# Patient Record
Sex: MALE | Race: OTHER | NOT HISPANIC OR LATINO
[De-identification: names, ages, dates, MRNs, and addresses within clinical notes are randomized per-mention and may not be internally consistent; named-entity substitution may affect disease eponyms.]

---

## 2017-07-24 ENCOUNTER — APPOINTMENT (OUTPATIENT)
Dept: VASCULAR SURGERY | Facility: CLINIC | Age: 67
End: 2017-07-24

## 2017-07-24 VITALS
RESPIRATION RATE: 16 BRPM | HEART RATE: 62 BPM | BODY MASS INDEX: 23.19 KG/M2 | DIASTOLIC BLOOD PRESSURE: 85 MMHG | SYSTOLIC BLOOD PRESSURE: 158 MMHG | HEIGHT: 68 IN | TEMPERATURE: 98.6 F | WEIGHT: 153 LBS

## 2017-07-24 RX ORDER — AMLODIPINE BESYLATE AND BENAZEPRIL HYDROCHLORIDE 5; 10 MG/1; MG/1
5-10 CAPSULE ORAL
Refills: 0 | Status: ACTIVE | COMMUNITY

## 2018-07-23 ENCOUNTER — APPOINTMENT (OUTPATIENT)
Dept: VASCULAR SURGERY | Facility: CLINIC | Age: 68
End: 2018-07-23
Payer: MEDICARE

## 2018-07-23 VITALS
BODY MASS INDEX: 24.44 KG/M2 | SYSTOLIC BLOOD PRESSURE: 144 MMHG | HEIGHT: 69 IN | DIASTOLIC BLOOD PRESSURE: 75 MMHG | HEART RATE: 61 BPM | WEIGHT: 165 LBS

## 2018-07-23 DIAGNOSIS — Z09 ENCOUNTER FOR FOLLOW-UP EXAMINATION AFTER COMPLETED TREATMENT FOR CONDITIONS OTHER THAN MALIGNANT NEOPLASM: ICD-10-CM

## 2018-07-23 PROCEDURE — 93880 EXTRACRANIAL BILAT STUDY: CPT

## 2018-07-23 PROCEDURE — 99214 OFFICE O/P EST MOD 30 MIN: CPT

## 2019-07-22 ENCOUNTER — APPOINTMENT (OUTPATIENT)
Dept: VASCULAR SURGERY | Facility: CLINIC | Age: 69
End: 2019-07-22
Payer: MEDICARE

## 2019-07-22 PROCEDURE — 93880 EXTRACRANIAL BILAT STUDY: CPT

## 2019-09-02 PROBLEM — Z09 FOLLOW UP: Status: ACTIVE | Noted: 2018-07-23

## 2020-07-20 ENCOUNTER — APPOINTMENT (OUTPATIENT)
Dept: VASCULAR SURGERY | Facility: CLINIC | Age: 70
End: 2020-07-20
Payer: MEDICARE

## 2020-07-20 VITALS
HEIGHT: 68 IN | SYSTOLIC BLOOD PRESSURE: 152 MMHG | DIASTOLIC BLOOD PRESSURE: 79 MMHG | HEART RATE: 63 BPM | BODY MASS INDEX: 22.73 KG/M2 | WEIGHT: 150 LBS

## 2020-07-20 VITALS — TEMPERATURE: 96.8 F

## 2020-07-20 PROCEDURE — 93880 EXTRACRANIAL BILAT STUDY: CPT

## 2020-07-20 PROCEDURE — 99213 OFFICE O/P EST LOW 20 MIN: CPT

## 2020-07-20 NOTE — ASSESSMENT
[FreeTextEntry1] : 68 yo male with a history of bilateral carotid stenosis s/p left cea presents for follow up.  pt without any complaints at this time.\par \par duplex shows patent left cea with stable 16-49% stenosis and right 16-49% stenosis unchanged \par pt to continue with asa \par encouraged pt to exercise \par pt to follow up in 1 year with repeat duplex

## 2020-07-20 NOTE — PHYSICAL EXAM
[2+] : left 2+ [No Rash or Lesion] : No rash or lesion [Alert] : alert [Calm] : calm [JVD] : no jugular venous distention  [Normal Breath Sounds] : Normal breath sounds [Normal Heart Sounds] : normal heart sounds [Ankle Swelling (On Exam)] : not present [] : not present [Abdomen Masses] : No abdominal masses [Varicose Veins Of Lower Extremities] : not present [Skin Ulcer] : no ulcer [de-identified] : no facial asymmetry noted [de-identified] : appears well

## 2020-07-20 NOTE — HISTORY OF PRESENT ILLNESS
[FreeTextEntry1] : 70 yo male with a history of bilateral carotid stenosis s/p left cea presents for follow up.  pt without any complaints at this time.  pt denies any stroke like symptoms denies any stroke like symptoms.  \par pt reports a history of pad but denies any history of rest pain, ulcers or claudications\par pt does not walk much but no issues with pain that stops him from walking.  \par \par

## 2021-07-19 ENCOUNTER — APPOINTMENT (OUTPATIENT)
Dept: VASCULAR SURGERY | Facility: CLINIC | Age: 71
End: 2021-07-19
Payer: MEDICARE

## 2021-07-19 VITALS
DIASTOLIC BLOOD PRESSURE: 78 MMHG | HEART RATE: 69 BPM | WEIGHT: 152 LBS | SYSTOLIC BLOOD PRESSURE: 148 MMHG | BODY MASS INDEX: 23.04 KG/M2 | HEIGHT: 68 IN

## 2021-07-19 PROCEDURE — 99213 OFFICE O/P EST LOW 20 MIN: CPT

## 2021-07-19 PROCEDURE — 93880 EXTRACRANIAL BILAT STUDY: CPT

## 2021-07-19 RX ORDER — IRBESARTAN 150 MG/1
150 TABLET ORAL
Qty: 90 | Refills: 0 | Status: ACTIVE | COMMUNITY
Start: 2021-02-10

## 2021-07-19 RX ORDER — METOPROLOL TARTRATE 50 MG/1
50 TABLET, FILM COATED ORAL
Qty: 180 | Refills: 0 | Status: ACTIVE | COMMUNITY
Start: 2021-02-10

## 2021-07-19 RX ORDER — DICLOFENAC SODIUM 1% 10 MG/G
1 GEL TOPICAL
Qty: 100 | Refills: 0 | Status: ACTIVE | COMMUNITY
Start: 2020-09-10

## 2021-07-19 RX ORDER — AMLODIPINE BESYLATE 5 MG/1
5 TABLET ORAL
Qty: 90 | Refills: 0 | Status: ACTIVE | COMMUNITY
Start: 2021-05-19

## 2021-07-19 NOTE — PHYSICAL EXAM
[JVD] : no jugular venous distention  [Normal Breath Sounds] : Normal breath sounds [Normal Heart Sounds] : normal heart sounds [2+] : left 2+ [Ankle Swelling (On Exam)] : not present [Varicose Veins Of Lower Extremities] : not present [] : not present [Abdomen Masses] : No abdominal masses [No Rash or Lesion] : No rash or lesion [Skin Ulcer] : no ulcer [Alert] : alert [Calm] : calm [de-identified] : appears well  [de-identified] : no facial asymmetry noted

## 2021-07-19 NOTE — HISTORY OF PRESENT ILLNESS
[FreeTextEntry1] : 68 yo male with a history of bilateral carotid stenosis s/p left cea presents for follow up.  pt without any complaints at this time.  pt denies any stroke like symptoms denies any stroke like symptoms.  \par pt reports a history of pad but denies any history of rest pain, ulcers or claudications\par pt does not walk much but no issues with pain that stops him from walking.  \par \par

## 2022-08-01 ENCOUNTER — APPOINTMENT (OUTPATIENT)
Dept: VASCULAR SURGERY | Facility: CLINIC | Age: 72
End: 2022-08-01

## 2022-08-01 PROCEDURE — 93880 EXTRACRANIAL BILAT STUDY: CPT

## 2022-08-01 PROCEDURE — 99214 OFFICE O/P EST MOD 30 MIN: CPT

## 2022-08-01 NOTE — PHYSICAL EXAM
[JVD] : no jugular venous distention  [Normal Breath Sounds] : Normal breath sounds [Normal Heart Sounds] : normal heart sounds [2+] : left 2+ [Ankle Swelling (On Exam)] : not present [Varicose Veins Of Lower Extremities] : not present [Abdomen Masses] : No abdominal masses [] : not present [No Rash or Lesion] : No rash or lesion [Skin Ulcer] : no ulcer [Alert] : alert [Calm] : calm [de-identified] : appears well  [de-identified] : no facial asymmetry noted

## 2022-12-14 NOTE — ASSESSMENT
Stable. Continue to monitor. [FreeTextEntry1] : 70 yo male with a history of bilateral carotid stenosis s/p left cea presents for follow up.  pt without any complaints at this time.\par \par duplex shows patent left cea with stable 16-49% stenosis and right 16-49% stenosis unchanged \par pt to continue with asa \par encouraged pt to exercise \par pt to follow up in 1 year with repeat duplex

## 2023-04-03 ENCOUNTER — TRANSCRIPTION ENCOUNTER (OUTPATIENT)
Age: 73
End: 2023-04-03

## 2023-04-03 ENCOUNTER — NON-APPOINTMENT (OUTPATIENT)
Age: 73
End: 2023-04-03

## 2023-04-03 ENCOUNTER — APPOINTMENT (OUTPATIENT)
Dept: THORACIC SURGERY | Facility: CLINIC | Age: 73
End: 2023-04-03
Payer: MEDICARE

## 2023-04-03 VITALS
SYSTOLIC BLOOD PRESSURE: 130 MMHG | HEART RATE: 57 BPM | DIASTOLIC BLOOD PRESSURE: 67 MMHG | BODY MASS INDEX: 20 KG/M2 | WEIGHT: 132 LBS | OXYGEN SATURATION: 100 % | HEIGHT: 68 IN | RESPIRATION RATE: 15 BRPM

## 2023-04-03 DIAGNOSIS — R59.0 LOCALIZED ENLARGED LYMPH NODES: ICD-10-CM

## 2023-04-03 PROCEDURE — 99205 OFFICE O/P NEW HI 60 MIN: CPT

## 2023-04-05 RX ORDER — SULFAMETHOXAZOLE AND TRIMETHOPRIM 800; 160 MG/1; MG/1
800-160 TABLET ORAL
Qty: 6 | Refills: 0 | Status: COMPLETED | COMMUNITY
Start: 2022-02-09 | End: 2023-04-05

## 2023-04-05 NOTE — DATA REVIEWED
[FreeTextEntry1] : I have independently reviewed the following:\par CT Chest on 2/14/23:\par PET/CT on 3/22/23

## 2023-04-05 NOTE — ASSESSMENT
[FreeTextEntry1] : Mr. MCKAYLA BOWERS, 72 year old male, former smoker, w/ hx of HTN, who presented with abnormal CT scan of the chest. \par \par CT Chest on 2/14/23:\par - patchy area of ggo consolidation in RLL 5.2 cm\par - ggo in Rt apex 2.6 cm\par - ggo in RML 1.4 cm\par - ggo in LLL 1.6 cm \par - subpleural LLL 7 mm \par - subcarinal LN 1.6 cm, precarinal LN 1.4 cm\par - trace pericardial effusion\par \par PET/CT on 3/22/23:\par - mildly FDG avid Rt lower paratracheal LN 1.5 cm, SUV=2.7 \par - mildly enlarged subcarinal LN, 2.9 cm, SUV=2.5\par - small hiatal hernia with mild increased FDG uptake in distal esophagus/GE junction, SUV=4\par - hypermetabolic focu in mid sigmoid colon, SUV=4.7\par \par I have reviewed the patient's medical records and diagnostic images at time of this office consultation and have made the following recommendation:\par 1. PET reviewed, I recommended navigational bronchoscopy, EBUS biopsy, possible mediastinoscopy. Risks and benefits and alternatives explained to patient, all questions answered, patient agreed to proceed with surgery.\par 2. New CT Chest w/o contrast for navigational bronch protocol\par 3. Cardiac clearance \par \par \par I, , BERNADINE GA, personally performed the evaluation and management (E/M) services for this new patient.  That E/M includes conducting the initial examination, assessing all conditions, and establishing the plan of care.  Today, my ACP, Vidya Culver NP was here to observe my evaluation and management services for this patient to be followed going forward.\par \par

## 2023-04-05 NOTE — PHYSICAL EXAM
[Restricted in physically strenuous activity but ambulatory and able to carry out work of a light or sedentary nature] : Status 1- Restricted in physically strenuous activity but ambulatory and able to carry out work of a light or sedentary nature, e.g., light house work, office work [General Appearance - Alert] : alert [General Appearance - In No Acute Distress] : in no acute distress [Sclera] : the sclera and conjunctiva were normal [PERRL With Normal Accommodation] : pupils were equal in size, round, and reactive to light [Extraocular Movements] : extraocular movements were intact [Outer Ear] : the ears and nose were normal in appearance [Oropharynx] : the oropharynx was normal [Neck Appearance] : the appearance of the neck was normal [Neck Cervical Mass (___cm)] : no neck mass was observed [Jugular Venous Distention Increased] : there was no jugular-venous distention [Thyroid Diffuse Enlargement] : the thyroid was not enlarged [Thyroid Nodule] : there were no palpable thyroid nodules [Auscultation Breath Sounds / Voice Sounds] : lungs were clear to auscultation bilaterally [Heart Rate And Rhythm] : heart rate was normal and rhythm regular [Heart Sounds] : normal S1 and S2 [Heart Sounds Gallop] : no gallops [Murmurs] : no murmurs [Heart Sounds Pericardial Friction Rub] : no pericardial rub [Examination Of The Chest] : the chest was normal in appearance [Chest Visual Inspection Thoracic Asymmetry] : no chest asymmetry [Diminished Respiratory Excursion] : normal chest expansion [2+] : left 2+ [Breast Appearance] : normal in appearance [Breast Palpation Mass] : no palpable masses [Bowel Sounds] : normal bowel sounds [Abdomen Soft] : soft [Abdomen Tenderness] : non-tender [Abdomen Mass (___ Cm)] : no abdominal mass palpated [FreeTextEntry1] : deferred [Cervical Lymph Nodes Enlarged Posterior Bilaterally] : posterior cervical [Cervical Lymph Nodes Enlarged Anterior Bilaterally] : anterior cervical [Supraclavicular Lymph Nodes Enlarged Bilaterally] : supraclavicular [No CVA Tenderness] : no ~M costovertebral angle tenderness [No Spinal Tenderness] : no spinal tenderness [Abnormal Walk] : normal gait [Nail Clubbing] : no clubbing  or cyanosis of the fingernails [Musculoskeletal - Swelling] : no joint swelling seen [Motor Tone] : muscle strength and tone were normal [Skin Color & Pigmentation] : normal skin color and pigmentation [Skin Turgor] : normal skin turgor [] : no rash [Deep Tendon Reflexes (DTR)] : deep tendon reflexes were 2+ and symmetric [Sensation] : the sensory exam was normal to light touch and pinprick [No Focal Deficits] : no focal deficits [Oriented To Time, Place, And Person] : oriented to person, place, and time [Impaired Insight] : insight and judgment were intact [Affect] : the affect was normal

## 2023-04-05 NOTE — CONSULT LETTER
[Dear  ___] : Dear  [unfilled], [Consult Letter:] : I had the pleasure of evaluating your patient, [unfilled]. [( Thank you for referring [unfilled] for consultation for _____ )] : Thank you for referring [unfilled] for consultation for [unfilled] [Please see my note below.] : Please see my note below. [Consult Closing:] : Thank you very much for allowing me to participate in the care of this patient.  If you have any questions, please do not hesitate to contact me. [Sincerely,] : Sincerely, [FreeTextEntry2] : Hugh Briceño DO (Hem/Onc/ref) [FreeTextEntry3] : Daniela Gonsales MD\par Attending Surgeon\par Division of Thoracic Surgery\par , [facility] [employer] at [facility]\par

## 2023-04-05 NOTE — HISTORY OF PRESENT ILLNESS
[FreeTextEntry1] : Mr. MCKAYLA BOWERS, 72 year old male, former smoker, w/ hx of HTN, who presented with abnormal CT scan of the chest. \par \par CT Chest on 2/14/23:\par - patchy area of ggo consolidation in RLL 5.2 cm\par - ggo in Rt apex 2.6 cm\par - ggo in RML 1.4 cm\par - ggo in LLL 1.6 cm \par - subpleural LLL 7 mm \par - subcarinal LN 1.6 cm, precarinal LN 1.4 cm\par - trace pericardial effusion\par \par PET/CT on 3/22/23:\par - mildly FDG avid Rt lower paratracheal LN 1.5 cm, SUV=2.7 \par - mildly enlarged subcarinal LN, 2.9 cm, SUV=2.5\par - small hiatal hernia with mild increased FDG uptake in distal esophagus/GE junction, SUV=4\par - hypermetabolic focu in mid sigmoid colon, SUV=4.7\par \par Pt presents today for CT Sx consultation, referred by Dr. Briceño. \par \par \par

## 2023-04-11 ENCOUNTER — OUTPATIENT (OUTPATIENT)
Dept: OUTPATIENT SERVICES | Facility: HOSPITAL | Age: 73
LOS: 1 days | End: 2023-04-11

## 2023-04-11 VITALS
SYSTOLIC BLOOD PRESSURE: 140 MMHG | HEART RATE: 60 BPM | OXYGEN SATURATION: 97 % | DIASTOLIC BLOOD PRESSURE: 70 MMHG | RESPIRATION RATE: 16 BRPM | HEIGHT: 67.5 IN | TEMPERATURE: 97 F | WEIGHT: 130.07 LBS

## 2023-04-11 DIAGNOSIS — R91.8 OTHER NONSPECIFIC ABNORMAL FINDING OF LUNG FIELD: ICD-10-CM

## 2023-04-11 DIAGNOSIS — Z98.89 OTHER SPECIFIED POSTPROCEDURAL STATES: Chronic | ICD-10-CM

## 2023-04-11 DIAGNOSIS — I10 ESSENTIAL (PRIMARY) HYPERTENSION: ICD-10-CM

## 2023-04-11 DIAGNOSIS — Z91.89 OTHER SPECIFIED PERSONAL RISK FACTORS, NOT ELSEWHERE CLASSIFIED: ICD-10-CM

## 2023-04-11 DIAGNOSIS — Z98.890 OTHER SPECIFIED POSTPROCEDURAL STATES: Chronic | ICD-10-CM

## 2023-04-11 DIAGNOSIS — R59.0 LOCALIZED ENLARGED LYMPH NODES: ICD-10-CM

## 2023-04-11 LAB
ALBUMIN SERPL ELPH-MCNC: 4.2 G/DL — SIGNIFICANT CHANGE UP (ref 3.3–5)
ALP SERPL-CCNC: 90 U/L — SIGNIFICANT CHANGE UP (ref 40–120)
ALT FLD-CCNC: 15 U/L — SIGNIFICANT CHANGE UP (ref 4–41)
ANION GAP SERPL CALC-SCNC: 12 MMOL/L — SIGNIFICANT CHANGE UP (ref 7–14)
AST SERPL-CCNC: 19 U/L — SIGNIFICANT CHANGE UP (ref 4–40)
BILIRUB SERPL-MCNC: 0.4 MG/DL — SIGNIFICANT CHANGE UP (ref 0.2–1.2)
BLD GP AB SCN SERPL QL: NEGATIVE — SIGNIFICANT CHANGE UP
BUN SERPL-MCNC: 20 MG/DL — SIGNIFICANT CHANGE UP (ref 7–23)
CALCIUM SERPL-MCNC: 10.3 MG/DL — SIGNIFICANT CHANGE UP (ref 8.4–10.5)
CHLORIDE SERPL-SCNC: 101 MMOL/L — SIGNIFICANT CHANGE UP (ref 98–107)
CO2 SERPL-SCNC: 24 MMOL/L — SIGNIFICANT CHANGE UP (ref 22–31)
CREAT SERPL-MCNC: 1.45 MG/DL — HIGH (ref 0.5–1.3)
EGFR: 51 ML/MIN/1.73M2 — LOW
GLUCOSE SERPL-MCNC: 97 MG/DL — SIGNIFICANT CHANGE UP (ref 70–99)
HCT VFR BLD CALC: 42.9 % — SIGNIFICANT CHANGE UP (ref 39–50)
HGB BLD-MCNC: 13.5 G/DL — SIGNIFICANT CHANGE UP (ref 13–17)
MCHC RBC-ENTMCNC: 29.2 PG — SIGNIFICANT CHANGE UP (ref 27–34)
MCHC RBC-ENTMCNC: 31.5 GM/DL — LOW (ref 32–36)
MCV RBC AUTO: 92.7 FL — SIGNIFICANT CHANGE UP (ref 80–100)
NRBC # BLD: 0 /100 WBCS — SIGNIFICANT CHANGE UP (ref 0–0)
NRBC # FLD: 0 K/UL — SIGNIFICANT CHANGE UP (ref 0–0)
PLATELET # BLD AUTO: 385 K/UL — SIGNIFICANT CHANGE UP (ref 150–400)
POTASSIUM SERPL-MCNC: 5 MMOL/L — SIGNIFICANT CHANGE UP (ref 3.5–5.3)
POTASSIUM SERPL-SCNC: 5 MMOL/L — SIGNIFICANT CHANGE UP (ref 3.5–5.3)
PROT SERPL-MCNC: 7.4 G/DL — SIGNIFICANT CHANGE UP (ref 6–8.3)
RBC # BLD: 4.63 M/UL — SIGNIFICANT CHANGE UP (ref 4.2–5.8)
RBC # FLD: 14.4 % — SIGNIFICANT CHANGE UP (ref 10.3–14.5)
RH IG SCN BLD-IMP: POSITIVE — SIGNIFICANT CHANGE UP
SODIUM SERPL-SCNC: 137 MMOL/L — SIGNIFICANT CHANGE UP (ref 135–145)
WBC # BLD: 11.39 K/UL — HIGH (ref 3.8–10.5)
WBC # FLD AUTO: 11.39 K/UL — HIGH (ref 3.8–10.5)

## 2023-04-11 RX ORDER — SODIUM CHLORIDE 9 MG/ML
1000 INJECTION, SOLUTION INTRAVENOUS
Refills: 0 | Status: DISCONTINUED | OUTPATIENT
Start: 2023-04-17 | End: 2023-05-01

## 2023-04-11 NOTE — H&P PST ADULT - RADIAL PULSE
Patient: Dougie Poritllo    Procedure(s):  Left Total Hip Arthroplasty  - Wound Class: I-Clean    Diagnosis:DJD  Diagnosis Additional Information: djd left hip    Anesthesia Type:  General    Note:  Anesthesia Post Evaluation    Patient location during evaluation: PACU  Patient participation: Able to fully participate in evaluation  Level of consciousness: awake and alert  Pain management: adequate  Airway patency: patent  Cardiovascular status: acceptable  Respiratory status: acceptable  Hydration status: acceptable  PONV: controlled     Anesthetic complications: None          Last vitals:  Vitals:    07/06/18 1130 07/06/18 1207 07/06/18 1245   BP: 126/74 122/62 137/66   Pulse:      Resp: 10 15 13   Temp: 97  F (36.1  C) 95.3  F (35.2  C)    SpO2: 99% 100% 100%         Electronically Signed By: Johnnie Rios MD  July 6, 2018  1:11 PM  
right normal/left normal

## 2023-04-11 NOTE — H&P PST ADULT - NSICDXPASTMEDICALHX_GEN_ALL_CORE_FT
PAST MEDICAL HISTORY:  Abscess s/p left neck abscess as in infant    H/O carotid stenosis     Hyperlipidemia     Hypertension     Localized enlarged lymph nodes     PAD (peripheral artery disease)     Trauma s/p fall from tree onto left knee with meniscus tear

## 2023-04-11 NOTE — H&P PST ADULT - NSICDXPASTSURGICALHX_GEN_ALL_CORE_FT
PAST SURGICAL HISTORY:  H/O arthroscopy of left knee     History of CEA (carotid endarterectomy)     Status post appendectomy open appendectomy at age 10 years

## 2023-04-11 NOTE — H&P PST ADULT - PROBLEM SELECTOR PLAN 1
Patient tentatively scheduled for navigational bronchoscopy endobronchial ultrasound possible mediastinoscopy with cytology for 4/17/23. Pre-op instructions provided. Pt given verbal and written instructions with teach back on chlorhexidine shampoo and pepcid. Pt verbalized understanding with return demonstration.     CBC CMP T&S done.  h/o hyperkalemia 5.8 on 3/28. Pt states he was eating a lot of bananas.  Copy of cardiology evaluation in chart.  Pending copy of ekg from 3/2023

## 2023-04-11 NOTE — H&P PST ADULT - PROBLEM SELECTOR PLAN 2
Patient instructed to take amlodipine irbesartan and metoprolol with a sip of water on the morning of procedure.

## 2023-04-11 NOTE — H&P PST ADULT - HISTORY OF PRESENT ILLNESS
71 y/o male PMH HTN HLD Left CEA 2015 presents to presurgical testing with diagnosis of localized enlarged lymph nodes and other nonspecific abnormal finding of lung field. Pt with an abnormal CT scan. Pt is scheduled for navigational bronchoscopy endobronchial ultrasound possible mediastinoscopy with cytology.

## 2023-04-14 NOTE — ASU PATIENT PROFILE, ADULT - PAIN DESCRIPTION (FREQUENCY/QUALITY), PROFILE
Assessment per SGNA guidelines. Breathing non-labored. Skin warm, dry, intact, and appropriate for race. Abdomen soft.       
constant

## 2023-04-14 NOTE — ASU PATIENT PROFILE, ADULT - ABLE TO REACH PT
· Chronic  · Patient to work with occupational therapy and cognitive therapy  · With acute changes in mentation since in ICU - seen by neurology - underwent MRI brain,vEEG but no acute abnormalities found mom/no yes

## 2023-04-17 ENCOUNTER — RESULT REVIEW (OUTPATIENT)
Age: 73
End: 2023-04-17

## 2023-04-17 ENCOUNTER — TRANSCRIPTION ENCOUNTER (OUTPATIENT)
Age: 73
End: 2023-04-17

## 2023-04-17 ENCOUNTER — OUTPATIENT (OUTPATIENT)
Dept: OUTPATIENT SERVICES | Facility: HOSPITAL | Age: 73
LOS: 1 days | Discharge: HOME | End: 2023-04-17
Payer: MEDICARE

## 2023-04-17 ENCOUNTER — APPOINTMENT (OUTPATIENT)
Dept: THORACIC SURGERY | Facility: HOSPITAL | Age: 73
End: 2023-04-17

## 2023-04-17 VITALS
RESPIRATION RATE: 14 BRPM | OXYGEN SATURATION: 95 % | HEART RATE: 62 BPM | SYSTOLIC BLOOD PRESSURE: 110 MMHG | TEMPERATURE: 98 F | DIASTOLIC BLOOD PRESSURE: 51 MMHG

## 2023-04-17 VITALS
OXYGEN SATURATION: 100 % | SYSTOLIC BLOOD PRESSURE: 131 MMHG | HEART RATE: 56 BPM | WEIGHT: 130.07 LBS | DIASTOLIC BLOOD PRESSURE: 54 MMHG | RESPIRATION RATE: 16 BRPM | HEIGHT: 67.5 IN | TEMPERATURE: 98 F

## 2023-04-17 DIAGNOSIS — Z98.89 OTHER SPECIFIED POSTPROCEDURAL STATES: Chronic | ICD-10-CM

## 2023-04-17 DIAGNOSIS — R91.8 OTHER NONSPECIFIC ABNORMAL FINDING OF LUNG FIELD: ICD-10-CM

## 2023-04-17 DIAGNOSIS — Z98.890 OTHER SPECIFIED POSTPROCEDURAL STATES: Chronic | ICD-10-CM

## 2023-04-17 LAB
GRAM STN FLD: SIGNIFICANT CHANGE UP
NIGHT BLUE STAIN TISS: SIGNIFICANT CHANGE UP
SPECIMEN SOURCE: SIGNIFICANT CHANGE UP

## 2023-04-17 PROCEDURE — 88342 IMHCHEM/IMCYTCHM 1ST ANTB: CPT | Mod: 26

## 2023-04-17 PROCEDURE — 88307 TISSUE EXAM BY PATHOLOGIST: CPT | Mod: 26

## 2023-04-17 PROCEDURE — 88305 TISSUE EXAM BY PATHOLOGIST: CPT | Mod: 26

## 2023-04-17 PROCEDURE — 71045 X-RAY EXAM CHEST 1 VIEW: CPT | Mod: 26

## 2023-04-17 PROCEDURE — 88173 CYTOPATH EVAL FNA REPORT: CPT | Mod: 26

## 2023-04-17 PROCEDURE — 31645 BRNCHSC W/THER ASPIR 1ST: CPT

## 2023-04-17 PROCEDURE — 88360 TUMOR IMMUNOHISTOCHEM/MANUAL: CPT | Mod: 26,59

## 2023-04-17 PROCEDURE — 88341 IMHCHEM/IMCYTCHM EA ADD ANTB: CPT | Mod: 26

## 2023-04-17 PROCEDURE — 31624 DX BRONCHOSCOPE/LAVAGE: CPT

## 2023-04-17 PROCEDURE — 88189 FLOWCYTOMETRY/READ 16 & >: CPT

## 2023-04-17 PROCEDURE — 39402 MEDIASTINOSCPY W/LMPH NOD BX: CPT

## 2023-04-17 PROCEDURE — 31652 BRONCH EBUS SAMPLNG 1/2 NODE: CPT

## 2023-04-17 RX ORDER — METOPROLOL TARTRATE 50 MG
1 TABLET ORAL
Refills: 0 | DISCHARGE

## 2023-04-17 RX ORDER — OXYCODONE HYDROCHLORIDE 5 MG/1
1 TABLET ORAL
Qty: 6 | Refills: 0
Start: 2023-04-17

## 2023-04-17 RX ORDER — IRBESARTAN 75 MG/1
1 TABLET ORAL
Refills: 0 | DISCHARGE

## 2023-04-17 RX ORDER — DICLOFENAC SODIUM 30 MG/G
2 GEL TOPICAL
Refills: 0 | DISCHARGE

## 2023-04-17 RX ORDER — OXYCODONE HYDROCHLORIDE 5 MG/1
2.5 TABLET ORAL EVERY 6 HOURS
Refills: 0 | Status: DISCONTINUED | OUTPATIENT
Start: 2023-04-17 | End: 2023-04-17

## 2023-04-17 RX ORDER — ASPIRIN/CALCIUM CARB/MAGNESIUM 324 MG
1 TABLET ORAL
Refills: 0 | DISCHARGE

## 2023-04-17 RX ORDER — ACETAMINOPHEN 500 MG
975 TABLET ORAL EVERY 6 HOURS
Refills: 0 | Status: DISCONTINUED | OUTPATIENT
Start: 2023-04-17 | End: 2023-05-01

## 2023-04-17 RX ORDER — OXYCODONE HYDROCHLORIDE 5 MG/1
5 TABLET ORAL EVERY 6 HOURS
Refills: 0 | Status: DISCONTINUED | OUTPATIENT
Start: 2023-04-17 | End: 2023-04-17

## 2023-04-17 NOTE — BRIEF OPERATIVE NOTE - SPECIMENS
Level 7 lymph node for permanent, frozen, culture, Level 4R Lymph node for permanent, culture, Level 2R lymph node for permanent, endobronchial washings for cytology

## 2023-04-17 NOTE — ASU DISCHARGE PLAN (ADULT/PEDIATRIC) - "IF YOU OR YOUR GUARDIAN/FAMILY IS A SMOKER, IT IS IMPORTANT FOR YOUR HEALTH TO STOP SMOKING. PLEASE BE AWARE THAT SECOND HAND SMOKE IS ALSO HARMFUL."
Statement Selected Detail Level: Simple Recommendation Preamble: The following recommendations were made during the visit: Recommendations (Free Text): R essentials, Glysal 5/2 wash twice a day on face. Use only pea size amount, then rinse off with water.\\nApply Cerave AM as moisturizer in the morning for face and Cerave PM moisturizer for face at bed time.

## 2023-04-17 NOTE — PROVIDER CONTACT NOTE (OTHER) - SITUATION
Patient stated post procedure that he will drive himself home. His wife showed staff her drivers license but stated she does not drive and would be unable to pull the car up to the front entrance.

## 2023-04-17 NOTE — ASU DISCHARGE PLAN (ADULT/PEDIATRIC) - CARE PROVIDER_API CALL
Daniela Gonsales)  Surgery; Thoracic Surgery  [phone] [place] [address]  Phone: [phone]  Fax: [phone]  Follow Up Time: 2 weeks

## 2023-04-17 NOTE — BRIEF OPERATIVE NOTE - NSICDXBRIEFPOSTOP_GEN_ALL_CORE_FT
POST-OP DIAGNOSIS:  Pulmonary nodules 17-Apr-2023 10:12:27  Dorian Mccracken  Thoracic lymphadenopathy 17-Apr-2023 10:12:18  Dorian Mccracken

## 2023-04-17 NOTE — PROVIDER CONTACT NOTE (OTHER) - ACTION/TREATMENT ORDERED:
Patient was taken to hospital main entrance via wheelchair by staff member in stable condition , with wife accompanying.

## 2023-04-17 NOTE — BRIEF OPERATIVE NOTE - NSICDXBRIEFPROCEDURE_GEN_ALL_CORE_FT
PROCEDURES:  Bronchoscopy, flexible, adult 17-Apr-2023 10:10:40  Dorian Mccracken  Bronchoscopy with endobronchial ultrasound and 1 or 2 biopsies 17-Apr-2023 10:11:02  Dorian Mccracken  Mediastinoscopy, video-assisted 17-Apr-2023 10:11:19  Dorian Mccracken

## 2023-04-17 NOTE — ASU DISCHARGE PLAN (ADULT/PEDIATRIC) - NS MD DC FALL RISK RISK
For information on Fall & Injury Prevention, visit: [email or web address] OR  [email or web address] OR  https://www.cdc.gov/steadi/patient.html

## 2023-04-17 NOTE — BRIEF OPERATIVE NOTE - OPERATION/FINDINGS
Flexible bronchoscopy with no intraluminal lesions found  Endobronchial ultrasound showing level 7 lymph node and 4R with specimens taken, no definite diagnosis on intraoperative pathology  Endobronchial washings collected and sent for cytology  Mediastinoscopy through midline incision in neck with separation of strap muscles and dissection between trachea and pretracheal fascia  Lymph nodes at level 7, 4R, and 2R visualized and biopsied  Hemostasis achieved with knunit

## 2023-04-17 NOTE — BRIEF OPERATIVE NOTE - NSICDXBRIEFPREOP_GEN_ALL_CORE_FT
PRE-OP DIAGNOSIS:  Thoracic lymphadenopathy 17-Apr-2023 10:11:59  Dorian Mccracken  Pulmonary nodules 17-Apr-2023 10:12:12  Dorian Mccracken

## 2023-04-18 PROBLEM — Z86.79 PERSONAL HISTORY OF OTHER DISEASES OF THE CIRCULATORY SYSTEM: Chronic | Status: ACTIVE | Noted: 2023-04-11

## 2023-04-18 PROBLEM — R59.0 LOCALIZED ENLARGED LYMPH NODES: Chronic | Status: ACTIVE | Noted: 2023-04-11

## 2023-04-18 PROBLEM — I73.9 PERIPHERAL VASCULAR DISEASE, UNSPECIFIED: Chronic | Status: ACTIVE | Noted: 2023-04-11

## 2023-04-18 LAB
TM INTERPRETATION: SIGNIFICANT CHANGE UP
TM INTERPRETATION: SIGNIFICANT CHANGE UP

## 2023-04-19 LAB
CULTURE RESULTS: SIGNIFICANT CHANGE UP
NON-GYNECOLOGICAL CYTOLOGY STUDY: SIGNIFICANT CHANGE UP
SPECIMEN SOURCE: SIGNIFICANT CHANGE UP

## 2023-04-21 LAB — HEMATOPATHOLOGY REPORT: SIGNIFICANT CHANGE UP

## 2023-04-22 LAB
CULTURE RESULTS: SIGNIFICANT CHANGE UP
CULTURE RESULTS: SIGNIFICANT CHANGE UP
SPECIMEN SOURCE: SIGNIFICANT CHANGE UP
SPECIMEN SOURCE: SIGNIFICANT CHANGE UP

## 2023-04-28 PROBLEM — R91.1 LUNG NODULE: Status: ACTIVE | Noted: 2023-04-03

## 2023-05-01 ENCOUNTER — APPOINTMENT (OUTPATIENT)
Dept: THORACIC SURGERY | Facility: CLINIC | Age: 73
End: 2023-05-01
Payer: MEDICARE

## 2023-05-01 VITALS
HEART RATE: 61 BPM | DIASTOLIC BLOOD PRESSURE: 67 MMHG | HEIGHT: 68 IN | BODY MASS INDEX: 19.7 KG/M2 | SYSTOLIC BLOOD PRESSURE: 153 MMHG | OXYGEN SATURATION: 99 % | WEIGHT: 130 LBS

## 2023-05-01 DIAGNOSIS — R91.1 SOLITARY PULMONARY NODULE: ICD-10-CM

## 2023-05-01 PROCEDURE — 99214 OFFICE O/P EST MOD 30 MIN: CPT

## 2023-05-01 RX ORDER — OMEGA-3-ACID ETHYL ESTERS CAPSULES 1 G/1
1 CAPSULE, LIQUID FILLED ORAL
Qty: 180 | Refills: 0 | Status: COMPLETED | COMMUNITY
Start: 2021-05-19 | End: 2023-05-01

## 2023-05-01 NOTE — CONSULT LETTER
[Dear  ___] : Dear  [unfilled], [Consult Letter:] : I had the pleasure of evaluating your patient, [unfilled]. [( Thank you for referring [unfilled] for consultation for _____ )] : Thank you for referring [unfilled] for consultation for [unfilled] [Please see my note below.] : Please see my note below. [Consult Closing:] : Thank you very much for allowing me to participate in the care of this patient.  If you have any questions, please do not hesitate to contact me. [Sincerely,] : Sincerely, [FreeTextEntry2] : Hugh Briceño DO (Hem/Onc/ref) [FreeTextEntry3] : Daniela Gonsales MD\par Attending Surgeon\par Division of Thoracic Surgery\par , [facility] [employer] [facility] at [facility]\par

## 2023-05-01 NOTE — DATA REVIEWED
[FreeTextEntry1] : I have independently reviewed the following:\par FB, EBUS, mediastinoscopy on 4/17/23

## 2023-05-01 NOTE — ASSESSMENT
[FreeTextEntry1] : Mr. MCKAYLA BOWERS, 72 year old male, former smoker, w/ hx of HTN, who presented with abnormal CT scan of the chest. \par \par Now s/p FB, EBUS, mediastinoscopy on 4/17/23. Path revealed Lvl 2R, 4R, and & are all lymphoid hyperplasia\par **There is no morphologic or immunophenotypic evidence supporting involvement by lymphoma in this biopsy.**\par ** Gram stain showed rare polymorphic leukocytes as well as rare gram negative rods. **\par \par I have reviewed the patient's medical records and diagnostic images at time of this office consultation and have made the following recommendation:\par 1. Path reviewed and explained to patient, negative for malignancy, RTC in June with CT Chest w/o contrast.\par \par \par I, Dr. TADEO, BERNADINE DOMINGUEZBETH, personally performed the evaluation and management (E/M) services for this established patient who presents today with (a) new problem(s)/exacerbation of (an) existing condition(s). That E/M includes conducting the examination, assessing all new/exacerbated conditions, and establishing a new plan of care. Today, my ACP, Yony Galeana NP was here to observe my evaluation and management services for this new problem/exacerbated condition to be followed going forward.\par \par \par

## 2023-05-01 NOTE — HISTORY OF PRESENT ILLNESS
[FreeTextEntry1] : Mr. MCKAYLA BOWERS, 72 year old male, former smoker, w/ hx of HTN, who presented with abnormal CT scan of the chest. \par \par CT Chest on 2/14/23:\par - patchy area of ggo consolidation in RLL 5.2 cm\par - ggo in Rt apex 2.6 cm\par - ggo in RML 1.4 cm\par - ggo in LLL 1.6 cm \par - subpleural LLL 7 mm \par - subcarinal LN 1.6 cm, precarinal LN 1.4 cm\par - trace pericardial effusion\par \par PET/CT on 3/22/23:\par - mildly FDG avid Rt lower paratracheal LN 1.5 cm, SUV=2.7 \par - mildly enlarged subcarinal LN, 2.9 cm, SUV=2.5\par - small hiatal hernia with mild increased FDG uptake in distal esophagus/GE junction, SUV=4\par - hypermetabolic focu in mid sigmoid colon, SUV=4.7\par \par CT Chest on 4/7/23:\par - new distal esophageal wall thickening \par - 8 mm solid nodule in LLL \par - 5 mm nodule in lingula, new\par - 1.1 cm ggo in RML, previously 8 mm \par \par Now s/p FB, EBUS, mediastinoscopy on 4/17/23. Path revealed Lvl 2R, 4R, and & are all lymphoid hyperplasia\par **There is no morphologic or immunophenotypic evidence supporting involvement by lymphoma in this biopsy.**\par ** Gram stain showed rare polymorphic leukocytes as well as rare gram negative rods. **\par \par Pt presents today for follow up.\par

## 2023-05-17 LAB
CULTURE RESULTS: SIGNIFICANT CHANGE UP
SPECIMEN SOURCE: SIGNIFICANT CHANGE UP

## 2023-06-03 LAB
CULTURE RESULTS: SIGNIFICANT CHANGE UP
SPECIMEN SOURCE: SIGNIFICANT CHANGE UP

## 2023-06-26 ENCOUNTER — APPOINTMENT (OUTPATIENT)
Dept: THORACIC SURGERY | Facility: CLINIC | Age: 73
End: 2023-06-26
Payer: MEDICARE

## 2023-06-26 VITALS
SYSTOLIC BLOOD PRESSURE: 124 MMHG | BODY MASS INDEX: 21.82 KG/M2 | OXYGEN SATURATION: 96 % | DIASTOLIC BLOOD PRESSURE: 67 MMHG | HEART RATE: 65 BPM | HEIGHT: 68 IN | RESPIRATION RATE: 17 BRPM | WEIGHT: 144 LBS

## 2023-06-26 DIAGNOSIS — R59.0 LOCALIZED ENLARGED LYMPH NODES: ICD-10-CM

## 2023-06-26 DIAGNOSIS — R91.8 OTHER NONSPECIFIC ABNORMAL FINDING OF LUNG FIELD: ICD-10-CM

## 2023-06-26 PROCEDURE — 99214 OFFICE O/P EST MOD 30 MIN: CPT

## 2023-06-26 NOTE — ASSESSMENT
[FreeTextEntry1] : Mr. MCKAYLA BOWERS, 72 year old male, former smoker, w/ hx of HTN, who presented with abnormal CT scan of the chest. \par \par Now s/p FB, EBUS, mediastinoscopy on 4/17/23. Path revealed Lvl 2R, 4R, and & are all lymphoid hyperplasia\par **There is no morphologic or immunophenotypic evidence supporting involvement by lymphoma in this biopsy.**\par ** Gram stain showed rare polymorphic leukocytes as well as rare gram negative rods. **\par \par CT Chest on 6/19/23:\par - stable 8 mm linear density with air bronchograms within LLL \par - previously 5 mm lingular nodular density has largely resolved \par - stable 1.1 cm ggo in RML\par - stable subcarinal fullness 2.4 cm \par - mild progression of previous subpleural opacity posterior medial aspect of the RLL\par \par He presents today for follow with imaging. \par \par I have reviewed the patient's medical records and diagnostic images at time of this office consultation and have made the following recommendation:\par 1. CT Chest reviewed with patient, revealing progress of RLL opacity. Will refer to Dr. Conley (IR) for biopsy. RTC after to discuss findings. Patient is agreeable. \par 2. Continue follow up with PCP and heme/onc.\par \par Recommendations reviewed with patient during this office visit, and all questions answered; Patient instructed on the importance of follow up and verbalizes understanding. \par \par \par I, BERNADINE Moya, personally performed the evaluation and management (E/M) services for this established patient. That E/M includes conducting the examination, assessing all new/exacerbated conditions, and establishing a new plan of care. Today, my ACP, Sreedhar Headley NP, was here to observe my evaluation and management services for this new problem/exacerbated condition to be followed going forward.\par

## 2023-06-26 NOTE — HISTORY OF PRESENT ILLNESS
[FreeTextEntry1] : Mr. MCKAYLA BOWERS, 72 year old male, former smoker, w/ hx of HTN, who presented with abnormal CT scan of the chest. \par \par CT Chest on 2/14/23:\par - patchy area of ggo consolidation in RLL 5.2 cm\par - ggo in Rt apex 2.6 cm\par - ggo in RML 1.4 cm\par - ggo in LLL 1.6 cm \par - subpleural LLL 7 mm \par - subcarinal LN 1.6 cm, precarinal LN 1.4 cm\par - trace pericardial effusion\par \par PET/CT on 3/22/23:\par - mildly FDG avid Rt lower paratracheal LN 1.5 cm, SUV=2.7 \par - mildly enlarged subcarinal LN, 2.9 cm, SUV=2.5\par - small hiatal hernia with mild increased FDG uptake in distal esophagus/GE junction, SUV=4\par - hypermetabolic focu in mid sigmoid colon, SUV=4.7\par \par CT Chest on 4/7/23:\par - new distal esophageal wall thickening \par - 8 mm solid nodule in LLL \par - 5 mm nodule in lingula, new\par - 1.1 cm ggo in RML, previously 8 mm \par \par Now s/p FB, EBUS, mediastinoscopy on 4/17/23. Path revealed Lvl 2R, 4R, and & are all lymphoid hyperplasia\par **There is no morphologic or immunophenotypic evidence supporting involvement by lymphoma in this biopsy.**\par ** Gram stain showed rare polymorphic leukocytes as well as rare gram negative rods. **\par \par CT Chest on 6/19/23:\par - stable 8 mm linear density with air bronchograms within LLL \par - previously 5 mm lingular nodular density has largely resolved \par - stable 1.1 cm ggo in RML\par - stable subcarinal fullness 2.4 cm \par - mild progression of previous subpleural opacity posterior medial aspect of the RLL\par \par Pt presents today for follow up. Overall, he reports to be feeling well. Denies any chest pain, shortness of breath, cough, or hemoptysis. \par \par

## 2023-06-26 NOTE — CONSULT LETTER
[Dear  ___] : Dear  [unfilled], [Consult Letter:] : I had the pleasure of evaluating your patient, [unfilled]. [( Thank you for referring [unfilled] for consultation for _____ )] : Thank you for referring [unfilled] for consultation for [unfilled] [Please see my note below.] : Please see my note below. [Consult Closing:] : Thank you very much for allowing me to participate in the care of this patient.  If you have any questions, please do not hesitate to contact me. [Sincerely,] : Sincerely, [FreeTextEntry2] : Hugh Briceño DO (Hem/Onc/ref) [FreeTextEntry3] : Daniela Gonsales MD\par Attending Surgeon\par Division of Thoracic Surgery\par , [facility] [employer] at [facility]\par

## 2023-09-14 ENCOUNTER — NON-APPOINTMENT (OUTPATIENT)
Age: 73
End: 2023-09-14

## 2023-10-16 ENCOUNTER — APPOINTMENT (OUTPATIENT)
Dept: VASCULAR SURGERY | Facility: CLINIC | Age: 73
End: 2023-10-16
Payer: MEDICARE

## 2023-10-16 PROCEDURE — 93880 EXTRACRANIAL BILAT STUDY: CPT

## 2023-10-16 PROCEDURE — 99214 OFFICE O/P EST MOD 30 MIN: CPT

## 2024-05-20 NOTE — H&P PST ADULT - LYMPHATICS COMMENTS
[Normal] : no abnormal secretions [de-identified] : healing tongue defect No anterior cervical lymphadenopathy

## 2024-10-21 ENCOUNTER — APPOINTMENT (OUTPATIENT)
Dept: VASCULAR SURGERY | Facility: CLINIC | Age: 74
End: 2024-10-21
Payer: MEDICARE

## 2024-10-21 PROCEDURE — 93880 EXTRACRANIAL BILAT STUDY: CPT

## 2024-10-21 PROCEDURE — 99213 OFFICE O/P EST LOW 20 MIN: CPT

## 2024-10-21 PROCEDURE — G2211 COMPLEX E/M VISIT ADD ON: CPT

## (undated) DEVICE — DRSG DERMABOND 0.7ML

## (undated) DEVICE — LIJ-LERUTE VIDEO MEDIASTINOSCOPY TRAY: Type: DURABLE MEDICAL EQUIPMENT

## (undated) DEVICE — POSITIONER FOAM EGG CRATE ULNAR 2PCS (PINK)

## (undated) DEVICE — DRSG CURITY GAUZE SPONGE 4 X 4" 12-PLY

## (undated) DEVICE — NDL ASPIRATION 22G W SYR

## (undated) DEVICE — BALLOON SINGLE FOR BF-UC160F

## (undated) DEVICE — TRAP SPECIMEN SPUTUM 40CC

## (undated) DEVICE — POSITIONER STRAP ARMBOARD VELCRO TS-30

## (undated) DEVICE — VALVE SUCTION EVIS 160/200/240

## (undated) DEVICE — GLV 8 PROTEXIS (WHITE)

## (undated) DEVICE — CHEST DRAIN PLEUR-EVAC DRY/WET ADULT-PEDS SINGLE (QUICK)

## (undated) DEVICE — VENODYNE/SCD SLEEVE CALF MEDIUM

## (undated) DEVICE — ADAPTER FIBEROPTIC BRONCHOSCOPE DUAL AXIS SWIVEL

## (undated) DEVICE — DRSG TELFA 3 X 8

## (undated) DEVICE — DRAPE 3/4 SHEET 52X76"

## (undated) DEVICE — VALVE BIOPSY BRONCHOVIDEOSCOPE

## (undated) DEVICE — SOL IRR POUR NS 0.9% 500ML

## (undated) DEVICE — NDL ASPIRATION VIZISHOT2 21G

## (undated) DEVICE — SYR SLIP 10CC

## (undated) DEVICE — DURABLE MEDICAL EQUIPMENT: Type: DURABLE MEDICAL EQUIPMENT

## (undated) DEVICE — DRAPE TOWEL BLUE 17" X 24"

## (undated) DEVICE — WARMING BLANKET LOWER ADULT

## (undated) DEVICE — TUBING SUCTION NONCONDUCTIVE 6MM X 12FT